# Patient Record
Sex: OTHER/UNKNOWN | HISPANIC OR LATINO
[De-identification: names, ages, dates, MRNs, and addresses within clinical notes are randomized per-mention and may not be internally consistent; named-entity substitution may affect disease eponyms.]

---

## 2024-09-26 ENCOUNTER — DASHBOARD ENCOUNTERS (OUTPATIENT)
Age: 39
End: 2024-09-26

## 2024-09-26 ENCOUNTER — OFFICE VISIT (OUTPATIENT)
Dept: URBAN - METROPOLITAN AREA CLINIC 113 | Facility: CLINIC | Age: 39
End: 2024-09-26
Payer: SELF-PAY

## 2024-09-26 VITALS
RESPIRATION RATE: 16 BRPM | HEART RATE: 73 BPM | WEIGHT: 170.8 LBS | BODY MASS INDEX: 34.43 KG/M2 | HEIGHT: 59 IN | DIASTOLIC BLOOD PRESSURE: 69 MMHG | TEMPERATURE: 97.9 F | SYSTOLIC BLOOD PRESSURE: 97 MMHG

## 2024-09-26 DIAGNOSIS — R14.0 ABDOMINAL DISTENTION: ICD-10-CM

## 2024-09-26 DIAGNOSIS — R12 HEARTBURN: ICD-10-CM

## 2024-09-26 DIAGNOSIS — R10.10 UPPER ABDOMINAL PAIN: ICD-10-CM

## 2024-09-26 DIAGNOSIS — R14.0 ABDOMINAL BLOATING: ICD-10-CM

## 2024-09-26 PROBLEM — 16331000: Status: ACTIVE | Noted: 2024-09-26

## 2024-09-26 PROBLEM — 83132003: Status: ACTIVE | Noted: 2024-09-26

## 2024-09-26 PROBLEM — 60728008: Status: ACTIVE | Noted: 2024-09-26

## 2024-09-26 PROCEDURE — 99203 OFFICE O/P NEW LOW 30 MIN: CPT | Performed by: INTERNAL MEDICINE

## 2024-09-26 RX ORDER — OMEPRAZOLE 20 MG/1
1 CAPSULE 30 MINUTES BEFORE MORNING MEAL CAPSULE, DELAYED RELEASE ORAL ONCE A DAY
Status: ACTIVE | COMMUNITY

## 2024-09-26 RX ORDER — CLINDAMYCIN HYDROCHLORIDE 300 MG/1
1 CAPSULE CAPSULE ORAL
Status: ACTIVE | COMMUNITY

## 2024-09-26 RX ORDER — SEMAGLUTIDE 1.34 MG/ML
AS DIRECTED INJECTION, SOLUTION SUBCUTANEOUS
Status: ACTIVE | COMMUNITY

## 2024-09-26 RX ORDER — PANTOPRAZOLE SODIUM 40 MG/1
1 TABLET TABLET, DELAYED RELEASE ORAL ONCE A DAY
Qty: 30 | Refills: 3 | OUTPATIENT
Start: 2024-09-26

## 2024-09-26 NOTE — HPI-TODAY'S VISIT:
39-year-old referred by urgent care for bloating and inability to pass gas.  Apparently in office H. pylori test has been negative.  She was treated with omeprazole and simethicone.  The patient speaks [place] and her daughter is here and is translating.  Ever since her cholecystectomy she has been having difficulty with gas and bloating and abdominal distention and upper abdominal pains.  She can feel like she is suffocating because the gas is so prevalent.  In addition in the morning she will be very distended in her abdomen with gas and bloating she is unable to pass flatus very much.  She apparently had an open cholecystectomy at [facility] and it sounds like they did a partial cholecystectomy probably secondary to extreme inflammation.  She did have gallstone but not clear if that was removed with her partial cholecystectomy.  She denies any fevers she does get heartburn symptoms about once a week.  She denies any dysphagia.  She gets some acid taste in her mouth intermittently.  She does use ibuprofen at times.  There is no fever or chills she has had some weight gain she denies any nausea or vomiting but does have some discomfort in her upper abdomen with abdominal distention.  When the distention improves she feels better.  She does move her bowels about every 3 days and uses MiraLAX intermittently and can have 3 bowel movements a week now.  Has been no blood or melena.  She is unable to burp.  She has stomach troubles when she eats cheese.  She was placed on omeprazole 20 mg each day and that has improved her abdominal burning.  Her GYN physician used to be Dr. Soto and currently is Dr. Sanders.

## 2024-09-26 NOTE — HPI-OTHER HISTORIES
CT scan of the abdomen pelvis without contrast on 8/8/2024 revealed diverticulosis without evidence for diverticulitis, IUD was in place.  No other acute abnormality was seen.

## 2024-09-28 LAB
A/G RATIO: 1.5
ABSOLUTE BASOPHILS: 24
ABSOLUTE EOSINOPHILS: 150
ABSOLUTE LYMPHOCYTES: 1928
ABSOLUTE MONOCYTES: 371
ABSOLUTE NEUTROPHILS: 5427
ALBUMIN: 4.1
ALKALINE PHOSPHATASE: 91
ALT (SGPT): 31
AST (SGOT): 18
BASOPHILS: 0.3
BILIRUBIN, TOTAL: 0.3
BUN/CREATININE RATIO: 21
BUN: 10
C-REACTIVE PROTEIN, QUANT: 4.6
CALCIUM: 8.9
CARBON DIOXIDE, TOTAL: 22
CHLORIDE: 105
CREATININE: 0.47
EGFR: 123
EOSINOPHILS: 1.9
GLOBULIN, TOTAL: 2.7
GLUCOSE: 84
HEMATOCRIT: 39.6
HEMOGLOBIN: 12.8
LIPASE: 17
LYMPHOCYTES: 24.4
MCH: 29.5
MCHC: 32.3
MCV: 91.2
MONOCYTES: 4.7
MPV: 11.4
NEUTROPHILS: 68.7
PLATELET COUNT: 244
POTASSIUM: 3.9
PROTEIN, TOTAL: 6.8
RDW: 14.1
RED BLOOD CELL COUNT: 4.34
SODIUM: 138
TSH W/REFLEX TO FT4: 1.88
WHITE BLOOD CELL COUNT: 7.9

## 2024-09-30 ENCOUNTER — LAB OUTSIDE AN ENCOUNTER (OUTPATIENT)
Dept: URBAN - METROPOLITAN AREA CLINIC 113 | Facility: CLINIC | Age: 39
End: 2024-09-30

## 2024-09-30 ENCOUNTER — TELEPHONE ENCOUNTER (OUTPATIENT)
Dept: URBAN - METROPOLITAN AREA CLINIC 113 | Facility: CLINIC | Age: 39
End: 2024-09-30